# Patient Record
Sex: FEMALE | Race: WHITE | NOT HISPANIC OR LATINO | Employment: FULL TIME | URBAN - NONMETROPOLITAN AREA
[De-identification: names, ages, dates, MRNs, and addresses within clinical notes are randomized per-mention and may not be internally consistent; named-entity substitution may affect disease eponyms.]

---

## 2017-04-21 ENCOUNTER — OFFICE VISIT (OUTPATIENT)
Dept: UROLOGY | Facility: CLINIC | Age: 47
End: 2017-04-21

## 2017-04-21 VITALS
HEIGHT: 70 IN | DIASTOLIC BLOOD PRESSURE: 79 MMHG | HEART RATE: 93 BPM | BODY MASS INDEX: 25.05 KG/M2 | WEIGHT: 175 LBS | SYSTOLIC BLOOD PRESSURE: 128 MMHG

## 2017-04-21 DIAGNOSIS — R31.29 MICROSCOPIC HEMATURIA: ICD-10-CM

## 2017-04-21 DIAGNOSIS — R10.9 LEFT FLANK PAIN: ICD-10-CM

## 2017-04-21 DIAGNOSIS — N39.0 URINARY TRACT INFECTION, SITE UNSPECIFIED: Primary | ICD-10-CM

## 2017-04-21 DIAGNOSIS — R31.0 GROSS HEMATURIA: ICD-10-CM

## 2017-04-21 DIAGNOSIS — R30.1 PAINFUL BLADDER SPASM: ICD-10-CM

## 2017-04-21 LAB
ANION GAP SERPL CALCULATED.3IONS-SCNC: 5.4 MMOL/L (ref 3.6–11.2)
BILIRUB BLD-MCNC: NEGATIVE MG/DL
BUN BLD-MCNC: 13 MG/DL (ref 7–21)
BUN/CREAT SERPL: 17.1 (ref 7–25)
CALCIUM SPEC-SCNC: 9.6 MG/DL (ref 7.7–10)
CHLORIDE SERPL-SCNC: 110 MMOL/L (ref 99–112)
CLARITY, POC: CLEAR
CO2 SERPL-SCNC: 28.6 MMOL/L (ref 24.3–31.9)
COLOR UR: YELLOW
CREAT BLD-MCNC: 0.76 MG/DL (ref 0.43–1.29)
GFR SERPL CREATININE-BSD FRML MDRD: 83 ML/MIN/1.73
GLUCOSE BLD-MCNC: 128 MG/DL (ref 70–110)
GLUCOSE UR STRIP-MCNC: NEGATIVE MG/DL
KETONES UR QL: NEGATIVE
LEUKOCYTE EST, POC: NEGATIVE
NITRITE UR-MCNC: NEGATIVE MG/ML
OSMOLALITY SERPL CALC.SUM OF ELEC: 288.6 MOSM/KG (ref 273–305)
PH UR: 5.5 [PH] (ref 5–8)
POTASSIUM BLD-SCNC: 4.4 MMOL/L (ref 3.5–5.3)
PROT UR STRIP-MCNC: NEGATIVE MG/DL
RBC # UR STRIP: NORMAL /UL
SODIUM BLD-SCNC: 144 MMOL/L (ref 135–153)
SP GR UR: 1.03 (ref 1–1.03)
UROBILINOGEN UR QL: NORMAL

## 2017-04-21 PROCEDURE — 51798 US URINE CAPACITY MEASURE: CPT | Performed by: NURSE PRACTITIONER

## 2017-04-21 PROCEDURE — 99203 OFFICE O/P NEW LOW 30 MIN: CPT | Performed by: NURSE PRACTITIONER

## 2017-04-21 PROCEDURE — 81003 URINALYSIS AUTO W/O SCOPE: CPT | Performed by: NURSE PRACTITIONER

## 2017-04-21 PROCEDURE — 80048 BASIC METABOLIC PNL TOTAL CA: CPT | Performed by: NURSE PRACTITIONER

## 2017-04-21 RX ORDER — LISINOPRIL 10 MG/1
10 TABLET ORAL DAILY
COMMUNITY
End: 2022-04-25 | Stop reason: ALTCHOICE

## 2017-04-21 RX ORDER — ESOMEPRAZOLE MAGNESIUM 40 MG/1
CAPSULE, DELAYED RELEASE ORAL
COMMUNITY
Start: 2017-04-10

## 2017-04-21 RX ORDER — GABAPENTIN 100 MG/1
100 CAPSULE ORAL 3 TIMES DAILY
COMMUNITY
End: 2019-07-30

## 2017-04-21 RX ORDER — BUSPIRONE HYDROCHLORIDE 10 MG/1
10 TABLET ORAL 3 TIMES DAILY
COMMUNITY

## 2017-04-21 NOTE — PROGRESS NOTES
Chief Complaint:          Chief Complaint   Patient presents with   • Urinary Tract Infection       HPI:   46 y.o. female being seen for history of hematuria and recurrent UTI.  She states she was recently seen by her PCP and was diagnosed with a UTI and was started on 2 rounds of antibiotics.  She continues to have microhematuria today. Has episodes of gross hematuria with the last being on Wednesday of the week. She further complains of bladder pain and pressure along with left sided back and flank pain.  Does have a history of a herniated disc in her back. Denies any difficulty with urination but has sensation of incomplete bladder emptying.  Bladder scan today shows 0 mL.  Has a strong family history of cancer in her family.  Complains of nausea every 3-4 days and is currently on Zofran.  Smoker of 1 ppd for 23 years.    HPI        Past Medical History:        Past Medical History:   Diagnosis Date   • Acid reflux    • Hypertension          Current Meds:     Current Outpatient Prescriptions   Medication Sig Dispense Refill   • busPIRone (BUSPAR) 10 MG tablet Take 10 mg by mouth 3 (Three) Times a Day.     • esomeprazole (nexIUM) 40 MG capsule      • gabapentin (NEURONTIN) 100 MG capsule Take 100 mg by mouth 3 (Three) Times a Day.     • lisinopril (PRINIVIL,ZESTRIL) 10 MG tablet Take 10 mg by mouth Daily.       No current facility-administered medications for this visit.         Allergies:      Allergies   Allergen Reactions   • Penicillins         Past Surgical History:     Past Surgical History:   Procedure Laterality Date   •  SECTION     • CHOLECYSTECTOMY     • SKIN CANCER EXCISION      Feet         Social History:     Social History     Social History   • Marital status: Unknown     Spouse name: N/A   • Number of children: N/A   • Years of education: N/A     Occupational History   • Not on file.     Social History Main Topics   • Smoking status: Current Every Day Smoker   • Smokeless tobacco: Never  Used   • Alcohol use No   • Drug use: No   • Sexual activity: Not on file     Other Topics Concern   • Not on file     Social History Narrative   • No narrative on file       Family History:     Family History   Problem Relation Age of Onset   • Lung cancer Father    • Stomach cancer Mother    • Cancer Brother      bladder cancer       Review of Systems:     Review of Systems   Constitutional: Positive for fatigue. Negative for chills and fever.   Respiratory: Negative for cough, shortness of breath and wheezing.    Cardiovascular: Negative for leg swelling.   Gastrointestinal: Positive for abdominal pain. Negative for nausea and vomiting.   Musculoskeletal: Positive for back pain. Negative for joint swelling.   Neurological: Negative for dizziness and headaches.   Psychiatric/Behavioral: Negative for confusion.       Physical Exam:     Physical Exam   Constitutional: She is oriented to person, place, and time. She appears well-developed and well-nourished. No distress.   Abdominal: Soft. Bowel sounds are normal. She exhibits no distension and no mass. There is no tenderness. There is no rebound and no guarding. No hernia.   Musculoskeletal: Normal range of motion.   Neurological: She is alert and oriented to person, place, and time.   Skin: Skin is warm and dry. No rash noted. She is not diaphoretic. No erythema. No pallor.   Psychiatric: She has a normal mood and affect. Her behavior is normal. Judgment and thought content normal.   Nursing note and vitals reviewed.      Procedure:     No notes on file      Assessment:     Encounter Diagnoses   Name Primary?   • Urinary tract infection, site unspecified Yes   • Gross hematuria    • Microscopic hematuria    • Painful bladder spasm    • Left flank pain      Orders Placed This Encounter   Procedures   • CT Abdomen Pelvis With & Without Contrast     Standing Status:   Future     Standing Expiration Date:   4/21/2018     Scheduling Instructions:      No oral contrast,  do renal mass protocol     Order Specific Question:   Reason for Exam:     Answer:   gross hematuria: painful bladder; left back and flank pain     Order Specific Question:   Patient Pregnant     Answer:   No   • Basic Metabolic Panel   • POC Urinalysis Dipstick, Automated       Plan:   Will attempt to get a copy of the urine culture completed at her PCP office.  Recommend she have a CT scan of the abdomen and pelvis with and without contrast on a renal mass protocol and a cystoscopy by Dr. Arciniega or Dr. Larkin.  Discussed the procedures with the patient and she is agreeable.    Counseling was given to patient for the following topics diagnostic results, patient and family education, impressions and risks and benefits of treatment options. and the interim medical history and current results were reviewed.  A treatment plan with follow-up was made. Total time of the encounter was 32 minutes and 32 minutes were spent discussing Urinary tract infection, site unspecified [N39.0] face-to-face.       This document has been electronically signed by MEHNAZ Hutchison April 21, 2017 3:52 PM

## 2017-05-01 ENCOUNTER — HOSPITAL ENCOUNTER (OUTPATIENT)
Dept: CT IMAGING | Facility: HOSPITAL | Age: 47
Discharge: HOME | End: 2017-05-01
Admitting: NURSE PRACTITIONER

## 2017-05-01 DIAGNOSIS — R31.29 MICROSCOPIC HEMATURIA: ICD-10-CM

## 2017-05-01 DIAGNOSIS — N39.0 URINARY TRACT INFECTION, SITE UNSPECIFIED: ICD-10-CM

## 2017-05-01 DIAGNOSIS — R10.9 LEFT FLANK PAIN: ICD-10-CM

## 2017-05-01 DIAGNOSIS — R31.0 GROSS HEMATURIA: ICD-10-CM

## 2017-05-01 DIAGNOSIS — R30.1 PAINFUL BLADDER SPASM: ICD-10-CM

## 2017-05-01 LAB
B-HCG UR QL: NEGATIVE
INTERNAL NEGATIVE CONTROL: NEGATIVE
INTERNAL POSITIVE CONTROL: POSITIVE
Lab: NORMAL

## 2017-05-01 PROCEDURE — 0 IOPAMIDOL 61 % SOLUTION: Performed by: NURSE PRACTITIONER

## 2017-05-01 PROCEDURE — 74178 CT ABD&PLV WO CNTR FLWD CNTR: CPT | Performed by: RADIOLOGY

## 2017-05-01 PROCEDURE — 74178 CT ABD&PLV WO CNTR FLWD CNTR: CPT

## 2017-05-01 RX ADMIN — IOPAMIDOL 100 ML: 612 INJECTION, SOLUTION INTRAVENOUS at 09:42

## 2018-05-11 ENCOUNTER — TRANSCRIBE ORDERS (OUTPATIENT)
Dept: ADMINISTRATIVE | Facility: HOSPITAL | Age: 48
End: 2018-05-11

## 2018-05-11 ENCOUNTER — LAB (OUTPATIENT)
Dept: LAB | Facility: HOSPITAL | Age: 48
End: 2018-05-11

## 2018-05-11 DIAGNOSIS — R10.9 ABDOMINAL PAIN, UNSPECIFIED ABDOMINAL LOCATION: ICD-10-CM

## 2018-05-11 DIAGNOSIS — R10.9 ABDOMINAL PAIN, UNSPECIFIED ABDOMINAL LOCATION: Primary | ICD-10-CM

## 2018-09-13 ENCOUNTER — APPOINTMENT (OUTPATIENT)
Dept: MAMMOGRAPHY | Facility: HOSPITAL | Age: 48
End: 2018-09-13

## 2018-10-02 ENCOUNTER — LAB (OUTPATIENT)
Dept: LAB | Facility: HOSPITAL | Age: 48
End: 2018-10-02

## 2018-10-02 ENCOUNTER — TRANSCRIBE ORDERS (OUTPATIENT)
Dept: LAB | Facility: HOSPITAL | Age: 48
End: 2018-10-02

## 2018-10-02 DIAGNOSIS — R53.83 FATIGUE, UNSPECIFIED TYPE: ICD-10-CM

## 2018-10-02 DIAGNOSIS — R53.83 FATIGUE, UNSPECIFIED TYPE: Primary | ICD-10-CM

## 2018-10-02 LAB
25(OH)D3 SERPL-MCNC: 29 NG/ML
DEPRECATED RDW RBC AUTO: 45.6 FL (ref 37–54)
ERYTHROCYTE [DISTWIDTH] IN BLOOD BY AUTOMATED COUNT: 13.4 % (ref 11.3–14.5)
HCT VFR BLD AUTO: 44.1 % (ref 34.5–44)
HGB BLD-MCNC: 14.7 G/DL (ref 11.5–15.5)
MCH RBC QN AUTO: 30.9 PG (ref 27–31)
MCHC RBC AUTO-ENTMCNC: 33.3 G/DL (ref 32–36)
MCV RBC AUTO: 92.6 FL (ref 80–99)
PLATELET # BLD AUTO: 247 10*3/MM3 (ref 150–450)
PMV BLD AUTO: 10.4 FL (ref 6–12)
RBC # BLD AUTO: 4.76 10*6/MM3 (ref 3.89–5.14)
T4 FREE SERPL-MCNC: 1.5 NG/DL (ref 0.89–1.76)
TSH SERPL DL<=0.05 MIU/L-ACNC: 0.58 MIU/ML (ref 0.35–5.35)
VIT B12 BLD-MCNC: 698 PG/ML (ref 211–911)
WBC NRBC COR # BLD: 12.86 10*3/MM3 (ref 3.5–10.8)

## 2018-10-02 PROCEDURE — 36415 COLL VENOUS BLD VENIPUNCTURE: CPT

## 2018-10-02 PROCEDURE — 84443 ASSAY THYROID STIM HORMONE: CPT

## 2018-10-02 PROCEDURE — 82306 VITAMIN D 25 HYDROXY: CPT

## 2018-10-02 PROCEDURE — 84439 ASSAY OF FREE THYROXINE: CPT

## 2018-10-02 PROCEDURE — 85027 COMPLETE CBC AUTOMATED: CPT

## 2018-10-02 PROCEDURE — 82607 VITAMIN B-12: CPT

## 2018-10-04 ENCOUNTER — APPOINTMENT (OUTPATIENT)
Dept: MAMMOGRAPHY | Facility: HOSPITAL | Age: 48
End: 2018-10-04

## 2018-10-04 ENCOUNTER — APPOINTMENT (OUTPATIENT)
Dept: ULTRASOUND IMAGING | Facility: HOSPITAL | Age: 48
End: 2018-10-04

## 2018-11-05 DIAGNOSIS — R06.02 SOB (SHORTNESS OF BREATH): Primary | ICD-10-CM

## 2019-07-30 ENCOUNTER — OFFICE VISIT (OUTPATIENT)
Dept: UROLOGY | Facility: CLINIC | Age: 49
End: 2019-07-30

## 2019-07-30 ENCOUNTER — HOSPITAL ENCOUNTER (OUTPATIENT)
Dept: GENERAL RADIOLOGY | Facility: HOSPITAL | Age: 49
Discharge: HOME | End: 2019-07-30
Admitting: UROLOGY

## 2019-07-30 VITALS
DIASTOLIC BLOOD PRESSURE: 84 MMHG | SYSTOLIC BLOOD PRESSURE: 142 MMHG | WEIGHT: 187 LBS | HEIGHT: 70 IN | BODY MASS INDEX: 26.77 KG/M2

## 2019-07-30 DIAGNOSIS — N20.0 KIDNEY STONES: Primary | ICD-10-CM

## 2019-07-30 DIAGNOSIS — R10.9 FLANK PAIN: ICD-10-CM

## 2019-07-30 PROCEDURE — 74018 RADEX ABDOMEN 1 VIEW: CPT | Performed by: RADIOLOGY

## 2019-07-30 PROCEDURE — 74018 RADEX ABDOMEN 1 VIEW: CPT

## 2019-07-30 PROCEDURE — 99213 OFFICE O/P EST LOW 20 MIN: CPT | Performed by: UROLOGY

## 2019-07-30 RX ORDER — HYDROCODONE BITARTRATE AND ACETAMINOPHEN 5; 325 MG/1; MG/1
TABLET ORAL
Qty: 28 TABLET | Refills: 0 | Status: SHIPPED | OUTPATIENT
Start: 2019-07-30

## 2019-07-30 RX ORDER — IBUPROFEN 200 MG
200 TABLET ORAL EVERY 6 HOURS PRN
COMMUNITY

## 2019-07-30 RX ORDER — ACETAMINOPHEN 325 MG/1
650 TABLET ORAL EVERY 6 HOURS PRN
COMMUNITY

## 2019-07-30 RX ORDER — TAMSULOSIN HYDROCHLORIDE 0.4 MG/1
1 CAPSULE ORAL NIGHTLY
Qty: 30 CAPSULE | Refills: 11 | Status: SHIPPED | OUTPATIENT
Start: 2019-07-30

## 2019-07-30 NOTE — PROGRESS NOTES
Chief Complaint:          Right flank pain    HPI:   49 y.o. female.  Pt has had right sided flank pain for the past week.  She went to urgent care and for her pain.  Pt has had 7 stone this week.  Her kub today did not show any stones.  HPI  Pt took a photo on her phone that looked like a small stone when I looked at it on her phone    Past Medical History:        Past Medical History:   Diagnosis Date   • Acid reflux    • Hypertension          Current Meds:     Current Outpatient Medications   Medication Sig Dispense Refill   • acetaminophen (TYLENOL) 325 MG tablet Take 650 mg by mouth Every 6 (Six) Hours As Needed for Mild Pain .     • busPIRone (BUSPAR) 10 MG tablet Take 10 mg by mouth 3 (Three) Times a Day.     • esomeprazole (nexIUM) 40 MG capsule      • ibuprofen (ADVIL,MOTRIN) 200 MG tablet Take 200 mg by mouth Every 6 (Six) Hours As Needed for Mild Pain .     • lisinopril (PRINIVIL,ZESTRIL) 10 MG tablet Take 10 mg by mouth Daily.       No current facility-administered medications for this visit.         Allergies:      Allergies   Allergen Reactions   • Penicillins         Past Surgical History:     Past Surgical History:   Procedure Laterality Date   •  SECTION     • CHOLECYSTECTOMY     • SKIN CANCER EXCISION      Feet         Social History:     Social History     Socioeconomic History   • Marital status:      Spouse name: Not on file   • Number of children: Not on file   • Years of education: Not on file   • Highest education level: Not on file   Tobacco Use   • Smoking status: Current Every Day Smoker   • Smokeless tobacco: Never Used   Substance and Sexual Activity   • Alcohol use: No   • Drug use: No       Family History:     Family History   Problem Relation Age of Onset   • Lung cancer Father    • Stomach cancer Mother    • Cancer Brother         bladder cancer       Review of Systems:     Review of Systems   Constitutional: Positive for chills. Negative for fever.   HENT: Negative  "for sinus pressure and sinus pain.    Respiratory: Negative for cough.    Cardiovascular: Negative for leg swelling.   Gastrointestinal: Positive for abdominal pain.   Genitourinary: Positive for flank pain, frequency, hematuria and urgency. Negative for dysuria.   Musculoskeletal: Positive for back pain.   Neurological: Negative for headaches.   Psychiatric/Behavioral: The patient is not nervous/anxious.        Physical Exam:     Physical Exam   Constitutional: She is oriented to person, place, and time. She appears well-developed.   HENT:   Head: Normocephalic.   Right Ear: External ear normal.   Left Ear: External ear normal.   Nose: Nose normal.   Mouth/Throat: Oropharynx is clear and moist.   Eyes: Conjunctivae and EOM are normal. Pupils are equal, round, and reactive to light.   Neck: Normal range of motion. Neck supple. No tracheal deviation present. No thyromegaly present.   Cardiovascular: Normal heart sounds. Exam reveals no gallop and no friction rub.   No murmur heard.  Pulmonary/Chest: Effort normal and breath sounds normal. No respiratory distress. She has no wheezes. She has no rales. She exhibits no tenderness.   Abdominal: Soft. Bowel sounds are normal. She exhibits no distension and no mass. There is no tenderness. There is no rebound and no guarding. No hernia.   Musculoskeletal: Normal range of motion. She exhibits no edema.   Lymphadenopathy:     She has no cervical adenopathy.   Neurological: She is alert and oriented to person, place, and time. She displays normal reflexes. No cranial nerve deficit. She exhibits normal muscle tone. Coordination normal.   Skin: Skin is warm. No rash noted.   Psychiatric: She has a normal mood and affect. Judgment normal.       /84 (BP Location: Left arm, Patient Position: Sitting, Cuff Size: Adult)   Ht 177.8 cm (70\")   Wt 84.8 kg (187 lb)   BMI 26.83 kg/m²    Procedure:         Assessment:     Encounter Diagnoses   Name Primary?   • Kidney stones Yes "   • Flank pain        Orders Placed This Encounter   Procedures   • XR Abdomen KUB     Order Specific Question:   Reason for Exam:     Answer:   kidney stones     Order Specific Question:   Patient Pregnant     Answer:   Unknown       Patient reports that she is not currently experiencing any symptoms of urinary incontinence.      Plan:   Will get a ct scan to document size and number of stones will give her hat and straing.    Patient's Body mass index is 26.83 kg/m². BMI is within normal parameters. No follow-up required..      Smoking Cessation Counseling:  Current some day smoker. less than 3 minutes spent counseling. Will try to cut down.  Patient is going to follow up with PCP to discuss treatment/medication options to quit tobacco use.     During this visit, I spent 2 minutes counseling Aliza regarding tobacco cessation.        Counseling was given to patient and family for the following topics impressions as follows: stones. The interim medical history and current results were reviewed.  A treatment plan with follow-up was made for Kidney stones [N20.0].   This document has been electronically signed by Rene Arciniega MD July 30, 2019 4:04 PM

## 2019-08-06 ENCOUNTER — HOSPITAL ENCOUNTER (OUTPATIENT)
Dept: CT IMAGING | Facility: HOSPITAL | Age: 49
Discharge: HOME | End: 2019-08-06
Admitting: UROLOGY

## 2019-08-06 DIAGNOSIS — N20.0 KIDNEY STONES: ICD-10-CM

## 2019-08-06 PROCEDURE — 74176 CT ABD & PELVIS W/O CONTRAST: CPT

## 2019-08-06 PROCEDURE — 74176 CT ABD & PELVIS W/O CONTRAST: CPT | Performed by: RADIOLOGY

## 2019-08-13 ENCOUNTER — OFFICE VISIT (OUTPATIENT)
Dept: UROLOGY | Facility: CLINIC | Age: 49
End: 2019-08-13

## 2019-08-13 VITALS
SYSTOLIC BLOOD PRESSURE: 138 MMHG | BODY MASS INDEX: 26.92 KG/M2 | HEIGHT: 70 IN | WEIGHT: 188 LBS | DIASTOLIC BLOOD PRESSURE: 82 MMHG

## 2019-08-13 DIAGNOSIS — R10.9 FLANK PAIN: Primary | ICD-10-CM

## 2019-08-13 PROCEDURE — 99213 OFFICE O/P EST LOW 20 MIN: CPT | Performed by: UROLOGY

## 2019-08-13 PROCEDURE — 99406 BEHAV CHNG SMOKING 3-10 MIN: CPT | Performed by: UROLOGY

## 2019-08-13 NOTE — PROGRESS NOTES
Chief Complaint:          RIGHT flank oswald    HPI:   49 y.o. female.  Pt's pain is markedly improved.  Her ct scan did not show any stones.  HPI      Past Medical History:        Past Medical History:   Diagnosis Date   • Acid reflux    • Hypertension          Current Meds:     Current Outpatient Medications   Medication Sig Dispense Refill   • acetaminophen (TYLENOL) 325 MG tablet Take 650 mg by mouth Every 6 (Six) Hours As Needed for Mild Pain .     • esomeprazole (nexIUM) 40 MG capsule      • lisinopril (PRINIVIL,ZESTRIL) 10 MG tablet Take 10 mg by mouth Daily.     • busPIRone (BUSPAR) 10 MG tablet Take 10 mg by mouth 3 (Three) Times a Day.     • HYDROcodone-acetaminophen (NORCO) 5-325 MG per tablet 1 to 2 Tablets Every 6 Hours as Needed for PAIN 28 tablet 0   • ibuprofen (ADVIL,MOTRIN) 200 MG tablet Take 200 mg by mouth Every 6 (Six) Hours As Needed for Mild Pain .     • tamsulosin (FLOMAX) 0.4 MG capsule 24 hr capsule Take 1 capsule by mouth Every Night. 30 capsule 11     No current facility-administered medications for this visit.         Allergies:      Allergies   Allergen Reactions   • Penicillins         Past Surgical History:     Past Surgical History:   Procedure Laterality Date   •  SECTION     • CHOLECYSTECTOMY     • SKIN CANCER EXCISION      Feet         Social History:     Social History     Socioeconomic History   • Marital status:      Spouse name: Not on file   • Number of children: Not on file   • Years of education: Not on file   • Highest education level: Not on file   Tobacco Use   • Smoking status: Current Every Day Smoker   • Smokeless tobacco: Never Used   Substance and Sexual Activity   • Alcohol use: No   • Drug use: No       Family History:     Family History   Problem Relation Age of Onset   • Lung cancer Father    • Stomach cancer Mother    • Cancer Brother         bladder cancer       Review of Systems:     Review of Systems   Genitourinary: Positive for flank pain and  "vaginal discharge.   All other systems reviewed and are negative.        I have reviewed the follow portions of the patient's history and confirmed they are accurate today:  allergies, current medications, past family history, past medical history, past social history, past surgical history, problem list and ROS  Physical Exam:     Physical Exam   Constitutional: She is oriented to person, place, and time. She appears well-developed.   HENT:   Head: Normocephalic.   Right Ear: External ear normal.   Left Ear: External ear normal.   Nose: Nose normal.   Mouth/Throat: Oropharynx is clear and moist.   Eyes: Conjunctivae and EOM are normal. Pupils are equal, round, and reactive to light.   Neck: Normal range of motion. Neck supple. No tracheal deviation present. No thyromegaly present.   Cardiovascular: Normal heart sounds. Exam reveals no gallop and no friction rub.   No murmur heard.  Pulmonary/Chest: Effort normal and breath sounds normal. No respiratory distress. She has no wheezes. She has no rales. She exhibits no tenderness.   Abdominal: Soft. Bowel sounds are normal. She exhibits no distension and no mass. There is no tenderness. There is no rebound and no guarding. No hernia.   Genitourinary: Vagina normal and uterus normal.   Musculoskeletal: Normal range of motion. She exhibits no edema.   Lymphadenopathy:     She has no cervical adenopathy.   Neurological: She is alert and oriented to person, place, and time. She displays normal reflexes. No cranial nerve deficit. She exhibits normal muscle tone. Coordination normal.   Skin: Skin is warm. No rash noted.   Psychiatric: She has a normal mood and affect. Judgment normal.       /82 (BP Location: Left arm, Patient Position: Sitting, Cuff Size: Adult)   Ht 177.8 cm (70\")   Wt 85.3 kg (188 lb)   BMI 26.98 kg/m²    Procedure:         Assessment:     Encounter Diagnosis   Name Primary?   • Flank pain Yes       No orders of the defined types were placed in " this encounter.      Patient reports that she is not currently experiencing any symptoms of urinary incontinence.      Plan:   Will get litho link and see in 3 months.    Patient's Body mass index is 26.98 kg/m². BMI is within normal parameters. No follow-up required..      Smoking Cessation Counseling:  Current some day smoker. less than 3 minutes spent counseling. Agreeable to stop.  Patient is going to follow up with PCP to discuss treatment/medication options to quit tobacco use.     During this visit, I spent 3 minutes counseling Aliza regarding tobacco cessation.        Counseling was given to patient for the following topics instructions for management as follows: stones and stopping smoking. The interim medical history and current results were reviewed.  A treatment plan with follow-up was made for Flank pain [R10.9].       This document has been electronically signed by Rene Arciniega MD August 13, 2019 9:36 AM

## 2020-06-25 ENCOUNTER — TRANSCRIBE ORDERS (OUTPATIENT)
Dept: ADMINISTRATIVE | Facility: HOSPITAL | Age: 50
End: 2020-06-25

## 2020-07-13 ENCOUNTER — TRANSCRIBE ORDERS (OUTPATIENT)
Dept: ADMINISTRATIVE | Facility: HOSPITAL | Age: 50
End: 2020-07-13

## 2020-07-13 DIAGNOSIS — N64.4 BREAST PAIN, LEFT: Primary | ICD-10-CM

## 2020-07-13 DIAGNOSIS — N64.52 NIPPLE DISCHARGE: ICD-10-CM

## 2020-08-13 ENCOUNTER — HOSPITAL ENCOUNTER (OUTPATIENT)
Dept: ULTRASOUND IMAGING | Facility: HOSPITAL | Age: 50
Discharge: HOME | End: 2020-08-13

## 2020-08-13 ENCOUNTER — APPOINTMENT (OUTPATIENT)
Dept: OTHER | Facility: HOSPITAL | Age: 50
End: 2020-08-13

## 2020-08-13 ENCOUNTER — HOSPITAL ENCOUNTER (OUTPATIENT)
Dept: MAMMOGRAPHY | Facility: HOSPITAL | Age: 50
Discharge: HOME | End: 2020-08-13
Admitting: NURSE PRACTITIONER

## 2020-08-13 ENCOUNTER — TELEPHONE (OUTPATIENT)
Dept: MAMMOGRAPHY | Facility: HOSPITAL | Age: 50
End: 2020-08-13

## 2020-08-13 ENCOUNTER — TRANSCRIBE ORDERS (OUTPATIENT)
Dept: LAB | Facility: HOSPITAL | Age: 50
End: 2020-08-13

## 2020-08-13 ENCOUNTER — LAB (OUTPATIENT)
Dept: LAB | Facility: HOSPITAL | Age: 50
End: 2020-08-13

## 2020-08-13 DIAGNOSIS — N64.52 NIPPLE DISCHARGE: ICD-10-CM

## 2020-08-13 DIAGNOSIS — N64.4 BREAST PAIN, LEFT: ICD-10-CM

## 2020-08-13 DIAGNOSIS — Z01.419 ROUTINE GYNECOLOGICAL EXAMINATION: ICD-10-CM

## 2020-08-13 DIAGNOSIS — Z92.89 H/O MAMMOGRAM: ICD-10-CM

## 2020-08-13 DIAGNOSIS — N92.6 IRREGULAR MENSTRUAL CYCLE: ICD-10-CM

## 2020-08-13 DIAGNOSIS — Z01.419 ROUTINE GYNECOLOGICAL EXAMINATION: Primary | ICD-10-CM

## 2020-08-13 LAB
25(OH)D3 SERPL-MCNC: 49.6 NG/ML (ref 30–100)
ALBUMIN SERPL-MCNC: 4.2 G/DL (ref 3.5–5.2)
ALBUMIN/GLOB SERPL: 1.8 G/DL
ALP SERPL-CCNC: 74 U/L (ref 39–117)
ALT SERPL W P-5'-P-CCNC: 24 U/L (ref 1–33)
ANION GAP SERPL CALCULATED.3IONS-SCNC: 13.1 MMOL/L (ref 5–15)
AST SERPL-CCNC: 16 U/L (ref 1–32)
BILIRUB SERPL-MCNC: 0.3 MG/DL (ref 0–1.2)
BUN SERPL-MCNC: 14 MG/DL (ref 6–20)
BUN/CREAT SERPL: 21.2 (ref 7–25)
CALCIUM SPEC-SCNC: 9.9 MG/DL (ref 8.6–10.5)
CHLORIDE SERPL-SCNC: 102 MMOL/L (ref 98–107)
CHOLEST SERPL-MCNC: 209 MG/DL (ref 0–200)
CO2 SERPL-SCNC: 26.9 MMOL/L (ref 22–29)
CREAT SERPL-MCNC: 0.66 MG/DL (ref 0.57–1)
DEPRECATED RDW RBC AUTO: 42.3 FL (ref 37–54)
ERYTHROCYTE [DISTWIDTH] IN BLOOD BY AUTOMATED COUNT: 12.9 % (ref 12.3–15.4)
ESTRADIOL SERPL HS-MCNC: 5.3 PG/ML
FSH SERPL-ACNC: 81.5 MIU/ML
GFR SERPL CREATININE-BSD FRML MDRD: 96 ML/MIN/1.73
GLOBULIN UR ELPH-MCNC: 2.4 GM/DL
GLUCOSE SERPL-MCNC: 99 MG/DL (ref 65–99)
HBA1C MFR BLD: 5.93 % (ref 4.8–5.6)
HCT VFR BLD AUTO: 44.5 % (ref 34–46.6)
HDLC SERPL-MCNC: 33 MG/DL (ref 40–60)
HGB BLD-MCNC: 15.2 G/DL (ref 12–15.9)
LDLC SERPL CALC-MCNC: 138 MG/DL (ref 0–100)
LDLC/HDLC SERPL: 4.18 {RATIO}
LH SERPL-ACNC: 56.8 MIU/ML
MCH RBC QN AUTO: 30.8 PG (ref 26.6–33)
MCHC RBC AUTO-ENTMCNC: 34.2 G/DL (ref 31.5–35.7)
MCV RBC AUTO: 90.3 FL (ref 79–97)
PLATELET # BLD AUTO: 251 10*3/MM3 (ref 140–450)
PMV BLD AUTO: 10.9 FL (ref 6–12)
POTASSIUM SERPL-SCNC: 4 MMOL/L (ref 3.5–5.2)
PROGEST SERPL-MCNC: <0.05 NG/ML
PROLACTIN SERPL-MCNC: 6.14 NG/ML (ref 4.79–23.3)
PROT SERPL-MCNC: 6.6 G/DL (ref 6–8.5)
RBC # BLD AUTO: 4.93 10*6/MM3 (ref 3.77–5.28)
SODIUM SERPL-SCNC: 142 MMOL/L (ref 136–145)
TESTOST SERPL-MCNC: 10.1 NG/DL (ref 8.4–48.1)
TRIGL SERPL-MCNC: 191 MG/DL (ref 0–150)
TSH SERPL DL<=0.05 MIU/L-ACNC: 1.16 UIU/ML (ref 0.27–4.2)
VLDLC SERPL-MCNC: 38.2 MG/DL (ref 5–40)
WBC # BLD AUTO: 8.96 10*3/MM3 (ref 3.4–10.8)

## 2020-08-13 PROCEDURE — 82306 VITAMIN D 25 HYDROXY: CPT

## 2020-08-13 PROCEDURE — 80053 COMPREHEN METABOLIC PANEL: CPT

## 2020-08-13 PROCEDURE — 84402 ASSAY OF FREE TESTOSTERONE: CPT

## 2020-08-13 PROCEDURE — 83525 ASSAY OF INSULIN: CPT

## 2020-08-13 PROCEDURE — 77066 DX MAMMO INCL CAD BI: CPT | Performed by: RADIOLOGY

## 2020-08-13 PROCEDURE — 83002 ASSAY OF GONADOTROPIN (LH): CPT

## 2020-08-13 PROCEDURE — 85027 COMPLETE CBC AUTOMATED: CPT

## 2020-08-13 PROCEDURE — 84403 ASSAY OF TOTAL TESTOSTERONE: CPT

## 2020-08-13 PROCEDURE — 77066 DX MAMMO INCL CAD BI: CPT

## 2020-08-13 PROCEDURE — 84443 ASSAY THYROID STIM HORMONE: CPT

## 2020-08-13 PROCEDURE — 36415 COLL VENOUS BLD VENIPUNCTURE: CPT

## 2020-08-13 PROCEDURE — 82670 ASSAY OF TOTAL ESTRADIOL: CPT

## 2020-08-13 PROCEDURE — 76642 ULTRASOUND BREAST LIMITED: CPT

## 2020-08-13 PROCEDURE — 84146 ASSAY OF PROLACTIN: CPT

## 2020-08-13 PROCEDURE — 83001 ASSAY OF GONADOTROPIN (FSH): CPT

## 2020-08-13 PROCEDURE — 77062 BREAST TOMOSYNTHESIS BI: CPT | Performed by: RADIOLOGY

## 2020-08-13 PROCEDURE — G0279 TOMOSYNTHESIS, MAMMO: HCPCS

## 2020-08-13 PROCEDURE — 83036 HEMOGLOBIN GLYCOSYLATED A1C: CPT

## 2020-08-13 PROCEDURE — 82627 DEHYDROEPIANDROSTERONE: CPT

## 2020-08-13 PROCEDURE — 83498 ASY HYDROXYPROGESTERONE 17-D: CPT

## 2020-08-13 PROCEDURE — 76642 ULTRASOUND BREAST LIMITED: CPT | Performed by: RADIOLOGY

## 2020-08-13 PROCEDURE — 84144 ASSAY OF PROGESTERONE: CPT

## 2020-08-13 PROCEDURE — 80061 LIPID PANEL: CPT

## 2020-08-13 NOTE — TELEPHONE ENCOUNTER
"Attempted to call patient to notify of a surgical consult appoint with Dr. ERICA Armendariz on 8/19/20 @ [id number].  Unable to leave message for patient to call back \"mailbox full.\"  Attempted to call second contact number, no answer/voicemail.  Will attempt to call back at a later time.  "

## 2020-08-14 ENCOUNTER — TELEPHONE (OUTPATIENT)
Dept: MAMMOGRAPHY | Facility: HOSPITAL | Age: 50
End: 2020-08-14

## 2020-08-14 LAB
17OHP SERPL-MCNC: NORMAL NG/DL
DHEA-S SERPL-MCNC: 63.1 UG/DL (ref 41.2–243.7)
TESTOST FREE SERPL-MCNC: NORMAL PG/ML

## 2020-08-14 NOTE — TELEPHONE ENCOUNTER
Pt notified of surgical consult appointment with Dr. ERICA Armendariz on 8/19/20 @ [id number]/[id number] . Pt given office contact & location information. Told to bring photo ID, list of prescription & OTC medications, insurance information, must wear a mask. Encouraged pt to call back or contact surgeon's office with further questions. Pt verbalized understanding.

## 2020-08-16 LAB — INSULIN SERPL-ACNC: 11 UIU/ML

## 2020-08-17 ENCOUNTER — TRANSCRIBE ORDERS (OUTPATIENT)
Dept: ADMINISTRATIVE | Facility: HOSPITAL | Age: 50
End: 2020-08-17

## 2020-08-17 DIAGNOSIS — R10.12 LUQ ABDOMINAL PAIN: Primary | ICD-10-CM

## 2020-08-20 ENCOUNTER — TELEPHONE (OUTPATIENT)
Dept: GENETICS | Facility: HOSPITAL | Age: 50
End: 2020-08-20

## 2020-08-24 ENCOUNTER — TRANSCRIBE ORDERS (OUTPATIENT)
Dept: ADMINISTRATIVE | Facility: HOSPITAL | Age: 50
End: 2020-08-24

## 2020-08-24 ENCOUNTER — APPOINTMENT (OUTPATIENT)
Dept: CT IMAGING | Facility: HOSPITAL | Age: 50
End: 2020-08-24

## 2020-08-24 DIAGNOSIS — G89.29 CHRONIC LEFT UPPER QUADRANT PAIN: Primary | ICD-10-CM

## 2020-08-24 DIAGNOSIS — R10.12 ABDOMINAL PAIN, LEFT UPPER QUADRANT: ICD-10-CM

## 2020-08-24 DIAGNOSIS — R10.12 CHRONIC LEFT UPPER QUADRANT PAIN: Primary | ICD-10-CM

## 2020-08-31 ENCOUNTER — HOSPITAL ENCOUNTER (OUTPATIENT)
Dept: MRI IMAGING | Facility: HOSPITAL | Age: 50
Discharge: HOME | End: 2020-08-31
Admitting: SURGERY

## 2020-08-31 DIAGNOSIS — N64.4 PAIN OF LEFT BREAST: ICD-10-CM

## 2020-08-31 DIAGNOSIS — Z80.3 FAMILY HISTORY OF MALIGNANT NEOPLASM OF BREAST: ICD-10-CM

## 2020-08-31 PROCEDURE — 0 GADOBENATE DIMEGLUMINE 529 MG/ML SOLUTION: Performed by: SURGERY

## 2020-08-31 PROCEDURE — C8937 CAD BREAST MRI: HCPCS

## 2020-08-31 PROCEDURE — A9577 INJ MULTIHANCE: HCPCS | Performed by: SURGERY

## 2020-08-31 PROCEDURE — 77049 MRI BREAST C-+ W/CAD BI: CPT | Performed by: RADIOLOGY

## 2020-08-31 PROCEDURE — 77049 MRI BREAST C-+ W/CAD BI: CPT

## 2020-08-31 RX ADMIN — GADOBENATE DIMEGLUMINE 15 ML: 529 INJECTION, SOLUTION INTRAVENOUS at 13:52

## 2020-09-01 ENCOUNTER — TELEPHONE (OUTPATIENT)
Dept: GENETICS | Facility: HOSPITAL | Age: 50
End: 2020-09-01

## 2020-09-02 ENCOUNTER — TELEPHONE (OUTPATIENT)
Dept: MRI IMAGING | Facility: HOSPITAL | Age: 50
End: 2020-09-02

## 2020-09-09 ENCOUNTER — HOSPITAL ENCOUNTER (OUTPATIENT)
Dept: CT IMAGING | Facility: HOSPITAL | Age: 50
Discharge: HOME | End: 2020-09-09
Admitting: INTERNAL MEDICINE

## 2020-09-09 DIAGNOSIS — G89.29 CHRONIC LEFT UPPER QUADRANT PAIN: ICD-10-CM

## 2020-09-09 DIAGNOSIS — R10.12 ABDOMINAL PAIN, LEFT UPPER QUADRANT: ICD-10-CM

## 2020-09-09 DIAGNOSIS — R10.12 CHRONIC LEFT UPPER QUADRANT PAIN: ICD-10-CM

## 2020-09-09 PROCEDURE — 74178 CT ABD&PLV WO CNTR FLWD CNTR: CPT | Performed by: RADIOLOGY

## 2020-09-09 PROCEDURE — 74170 CT ABD WO CNTRST FLWD CNTRST: CPT

## 2020-09-09 PROCEDURE — 0 IOVERSOL 68 % SOLUTION: Performed by: INTERNAL MEDICINE

## 2020-09-09 RX ADMIN — IOVERSOL 100 ML: 678 INJECTION INTRA-ARTERIAL; INTRAVENOUS at 11:09

## 2020-09-22 ENCOUNTER — CLINICAL SUPPORT (OUTPATIENT)
Dept: GENETICS | Facility: HOSPITAL | Age: 50
End: 2020-09-22

## 2020-09-22 DIAGNOSIS — Z80.51 FAMILY HISTORY OF CANCER OF THE KIDNEY: ICD-10-CM

## 2020-09-22 DIAGNOSIS — Z80.3 FAMILY HISTORY OF BREAST CANCER: Primary | ICD-10-CM

## 2020-09-22 DIAGNOSIS — Z80.0 FAMILY HISTORY OF STOMACH CANCER: ICD-10-CM

## 2020-09-22 DIAGNOSIS — Z80.41 FH: OVARIAN CANCER: ICD-10-CM

## 2020-09-22 NOTE — PROGRESS NOTES
ADDENDUM:  As of 2/21/22 patient had not sent in saliva sample for genetic testing, and genetic testing has not been completed at this time.  Patient is welcome to recontact our office if she decides to pursue testing in the future.      Aliza Canela, a 50-year-old female, was referred for genetic counseling due to a family history of breast cancer.  Genetic counseling was provided via telehealth.  Ms. Canela was 14 years old at menarche and 25 when she had her first child.  She is premenopausal and retains her uterus and ovaries.  She had a colonoscopy earlier this year, with a few polyps identified.  She has annual mammograms and has had one breast biopsy.  She was interested in discussing her risk for a hereditary cancer syndrome.   Ms. Canela decided to pursue comprehensive genetic testing to evaluate her risk of cancer, therefore the CancerNext panel was ordered through [employer] which analyzes 36 genes associated with an increased cancer risk. The genes on this panel include APC, JOE, AXIN2, BARD1, BMPR1A, BRCA1, BRCA2, BRIP1, CDH1, CDK4, CDKN2A, CHEK2, DICER1, EPCAM, GREM1, HOXB13, MLH1, MRE11A, MSH2, MSH3, MSH6, MUTYH, NBN, NF1, NTHL1, PALB2, PMS2, POLD1, POLE, PTEN, RAD51C, RAD51D, RECQL, SMAD4, SMARCA4, STK11, and TP53. A saliva sample collection kit is being sent to Ms. Canela Results are expected in approximately 2-3 weeks after sample is sent in.    PERTINENT FAMILY HISTORY: (See attached pedigree)   Sister:   Breast cancer, 48  Sister:  Ovarian cancer, 30  Sister:  Liver cancer, 49    Melanoma  Sister:  Lung cancer  Brother:  Bone cancer, 4  Brother:  Bladder cancer, 55  Brother:  Kidney cancer, 45   Mother:  Stomach cancer, 75    We do not have medical records regarding any of these diagnoses.      RISK ASSESSMENT:  Ms. Canela’s family history raised the question of a hereditary cancer syndrome.  clearly meets NCCN guidelines criteria for BRCA1/2 testing based on having a close relative diagnosed  with ovarian cancer as well as a relative diagnosed with breast cancer under age 50.  When affected relatives are unavailable to pursue genetic testing, it is reasonable to offer to an unaffected individual.  Based on the presence of other clinically significant breast cancer related genes, the standard approach to hereditary cancer genetic testing at this time is via multi-gene panel, which evaluates for BRCA1/2 as well as several additional genes associated with a hereditary risk for breast cancer and other cancers. If genetic testing is negative, management should be guided by a family history-based risk assessment.    GENETIC COUNSELING:  We reviewed the family history information in detail. Cases of cancer follow three general patterns: sporadic, familial, and hereditary.  While most cancer is sporadic, some cases appear to occur in family clusters.  These cases are said to be familial and account for 10-20% of cancer cases.  Familial cases may be due to a combination of shared genes and environmental factors among family members.  In even fewer families, the risk for cancer is inherited, and the genes responsible for the increased cancer risk are known.      Family histories typical of hereditary cancer syndromes usually include multiple first- and second-degree relatives diagnosed with cancer types that define a syndrome.  These cases tend to be diagnosed at younger-than-expected ages and can be bilateral or multifocal.  The cancer in these families follows an autosomal dominant inheritance pattern, which indicates the likely presence of a mutation in a cancer susceptibility gene.  Children and siblings of an individual believed to carry this mutation have a 50% chance of inheriting that mutation, thereby inheriting the increased risk to develop cancer.  These mutations can be passed down from the maternal or the paternal lineage.    Hereditary breast cancer accounts for 5-10% of all cases of breast cancer.   A significant proportion (50%) of hereditary breast cancer can be attributed to mutations in the BRCA1 and BRCA2 genes.  Mutations in these genes confer an increased risk for breast cancer, ovarian cancer, male breast cancer, prostate cancer, and pancreatic cancer.  Women with a BRCA1 or BRCA2 mutation have up to an 87% lifetime risk of breast cancer and up to a 44% risk of ovarian cancer.  There are other clinically significant breast cancer related genes in addition to BRCA1/2, including PALB2, JOE, and CHEK2. There are other, more rare, hereditary cancer syndromes. Based on Ms. Canela’s family history and her desire to get more information regarding her personal risks, she opted to pursue testing through a panel evaluating several other genes known to increase the risk for cancer.    GENETIC TESTING:  The risks, benefits, and limitations of genetic testing and implications for clinical management following testing were reviewed.  DNA test results can influence decisions regarding screening and prevention.  Genetic testing can have significant psychological implications for both individuals and families. Also discussed was the possibility of employment and insurance discrimination based on genetic test results and the laws in place to prevent this, as well as the limitations of these laws.      We discussed panel testing, which would involve testing 34 genes associated with increased cancer risk. The implications of a positive or negative test result were discussed.  We also discussed the importance of testing on an affected relative. In general, a negative genetic test result is most informative if a mutation has first been established in an affected member of the family.  In cases where an affected individual is not available or interested in testing, it is appropriate to offer testing to an unaffected individual. We discussed the possibility that, in some cases, genetic test results may be ambiguous due to the  identification of a genetic variant. These variants may or may not be associated with an increased cancer risk. With multigene panel testing, it is not uncommon for a variant of uncertain significance (VUS) to be identified.  If a VUS is identified, testing family members is typically not recommended and screening recommendations are made based on the family history.  The laboratories that perform genetic testing work to reclassify the VUS and send out an amended report if and when a VUS is reclassified.  The majority of variant findings are ultimately reclassified to a negative result. Given her family history, a negative test result does not eliminate all cancer risk, although the risk would not be as high as it would with positive genetic testing. We also discussed that some of the genes on this particular panel have not been well studied yet and there may not be clear implications or guidelines for some of the genes included on this comprehensive panel.    PLAN: Genetic testing via the CancerNext panel through [employer] was ordered and a saliva sample collection kit is being sent to Ms. Canela.  We will contact Ms. Canela with her results once they are received.     Sara Verdugo MS, [facility]  Licensed Certified Genetic Counselor

## 2021-03-08 ENCOUNTER — HOSPITAL ENCOUNTER (OUTPATIENT)
Dept: GENERAL RADIOLOGY | Facility: HOSPITAL | Age: 51
Discharge: HOME | End: 2021-03-08
Admitting: UROLOGY

## 2021-03-08 ENCOUNTER — OFFICE VISIT (OUTPATIENT)
Dept: UROLOGY | Facility: CLINIC | Age: 51
End: 2021-03-08

## 2021-03-08 VITALS — WEIGHT: 180 LBS | HEIGHT: 70 IN | BODY MASS INDEX: 25.77 KG/M2 | TEMPERATURE: 97.7 F

## 2021-03-08 DIAGNOSIS — N20.0 KIDNEY STONES: Primary | ICD-10-CM

## 2021-03-08 DIAGNOSIS — R35.0 FREQUENCY OF MICTURITION: ICD-10-CM

## 2021-03-08 DIAGNOSIS — N39.0 RECURRENT URINARY TRACT INFECTION: ICD-10-CM

## 2021-03-08 DIAGNOSIS — N20.0 KIDNEY STONES: ICD-10-CM

## 2021-03-08 LAB
BILIRUB BLD-MCNC: ABNORMAL MG/DL
CLARITY, POC: CLEAR
COLOR UR: YELLOW
GLUCOSE UR STRIP-MCNC: NEGATIVE MG/DL
KETONES UR QL: NEGATIVE
LEUKOCYTE EST, POC: NEGATIVE
NITRITE UR-MCNC: NEGATIVE MG/ML
PH UR: 6 [PH] (ref 5–8)
PROT UR STRIP-MCNC: NEGATIVE MG/DL
RBC # UR STRIP: NEGATIVE /UL
SP GR UR: 1.02 (ref 1–1.03)
UROBILINOGEN UR QL: NORMAL

## 2021-03-08 PROCEDURE — 99213 OFFICE O/P EST LOW 20 MIN: CPT | Performed by: UROLOGY

## 2021-03-08 PROCEDURE — 74018 RADEX ABDOMEN 1 VIEW: CPT

## 2021-03-08 PROCEDURE — 74018 RADEX ABDOMEN 1 VIEW: CPT | Performed by: RADIOLOGY

## 2021-03-08 PROCEDURE — 81003 URINALYSIS AUTO W/O SCOPE: CPT | Performed by: UROLOGY

## 2021-03-08 RX ORDER — NITROFURANTOIN 25; 75 MG/1; MG/1
100 CAPSULE ORAL DAILY
Qty: 56 CAPSULE | Refills: 3 | Status: SHIPPED | OUTPATIENT
Start: 2021-03-08

## 2021-03-09 PROBLEM — N20.0 KIDNEY STONES: Status: ACTIVE | Noted: 2021-03-09

## 2021-03-09 PROBLEM — N39.0 RECURRENT URINARY TRACT INFECTION: Status: ACTIVE | Noted: 2021-03-09

## 2021-03-09 NOTE — PROGRESS NOTES
"Chief Complaint:          Chief Complaint   Patient presents with   • Flank Pain       HPI:   50 y.o. female referred as a new patient with multiple stones that she was seen at the [facility] on [address] and was told she had a \"stone with infection\" this is her third time this year.  She did not bring her films.  She has had blood in the urine.  She has been antibiotics since she was they are which was Macrobid.  She has irritable bowel syndrome with constipation with a stool weekly.  She is a  3 para 3 with prior C-sections negative physical exam negative urinalysis KUB was negative by my reading I Felicita put her on prophylactic antibiotics see her back in 6 weeks have her bring her films for my review next time she comes in.      Past Medical History:        Past Medical History:   Diagnosis Date   • Acid reflux    • Hypertension          Current Meds:     Current Outpatient Medications   Medication Sig Dispense Refill   • acetaminophen (TYLENOL) 325 MG tablet Take 650 mg by mouth Every 6 (Six) Hours As Needed for Mild Pain .     • esomeprazole (nexIUM) 40 MG capsule      • lisinopril (PRINIVIL,ZESTRIL) 10 MG tablet Take 10 mg by mouth Daily.     • busPIRone (BUSPAR) 10 MG tablet Take 10 mg by mouth 3 (Three) Times a Day.     • HYDROcodone-acetaminophen (NORCO) 5-325 MG per tablet 1 to 2 Tablets Every 6 Hours as Needed for PAIN 28 tablet 0   • ibuprofen (ADVIL,MOTRIN) 200 MG tablet Take 200 mg by mouth Every 6 (Six) Hours As Needed for Mild Pain .     • nitrofurantoin, macrocrystal-monohydrate, (Macrobid) 100 MG capsule Take 1 capsule by mouth Daily. 56 capsule 3   • tamsulosin (FLOMAX) 0.4 MG capsule 24 hr capsule Take 1 capsule by mouth Every Night. 30 capsule 11     No current facility-administered medications for this visit.        Allergies:      No Known Allergies     Past Surgical History:     Past Surgical History:   Procedure Laterality Date   • BREAST BIOPSY     •  " SECTION     • CHOLECYSTECTOMY     • SKIN CANCER EXCISION      Feet         Social History:     Social History     Socioeconomic History   • Marital status:      Spouse name: Not on file   • Number of children: Not on file   • Years of education: Not on file   • Highest education level: Not on file   Tobacco Use   • Smoking status: Current Every Day Smoker   • Smokeless tobacco: Never Used   Substance and Sexual Activity   • Alcohol use: No   • Drug use: No       Family History:     Family History   Problem Relation Age of Onset   • Lung cancer Father    • Stomach cancer Mother    • Cancer Brother         bladder cancer   • Breast cancer Sister         Late 40's   • Ovarian cancer Sister 35   • Endometrial cancer Neg Hx        Review of Systems:     Review of Systems   Constitutional: Negative.  Negative for activity change, appetite change, chills, diaphoresis, fatigue and unexpected weight change.   HENT: Negative for congestion, dental problem, drooling, ear discharge, ear pain, facial swelling, hearing loss, mouth sores, nosebleeds, postnasal drip, rhinorrhea, sinus pressure, sneezing, sore throat, tinnitus, trouble swallowing and voice change.    Eyes: Negative.  Negative for photophobia, pain, discharge, redness, itching and visual disturbance.   Respiratory: Negative.  Negative for apnea, cough, choking, chest tightness, shortness of breath, wheezing and stridor.    Cardiovascular: Negative.  Negative for chest pain, palpitations and leg swelling.   Gastrointestinal: Negative.  Negative for abdominal distention, abdominal pain, anal bleeding, blood in stool, constipation, diarrhea, nausea, rectal pain and vomiting.   Endocrine: Negative.  Negative for cold intolerance, heat intolerance, polydipsia, polyphagia and polyuria.   Genitourinary: Positive for flank pain.   Musculoskeletal: Negative for arthralgias, back pain, gait problem, joint swelling, myalgias, neck pain and neck stiffness.   Skin:  Negative.  Negative for color change, pallor, rash and wound.   Allergic/Immunologic: Negative.  Negative for environmental allergies, food allergies and immunocompromised state.   Neurological: Negative.  Negative for dizziness, tremors, seizures, syncope, facial asymmetry, speech difficulty, weakness, light-headedness, numbness and headaches.   Hematological: Negative.  Negative for adenopathy. Does not bruise/bleed easily.   Psychiatric/Behavioral: Negative for agitation, behavioral problems, confusion, decreased concentration, dysphoric mood, hallucinations, self-injury, sleep disturbance and suicidal ideas. The patient is not nervous/anxious and is not hyperactive.    All other systems reviewed and are negative.      Physical Exam:     Physical Exam  Constitutional:       Appearance: She is well-developed.   HENT:      Head: Normocephalic and atraumatic.      Right Ear: External ear normal.      Left Ear: External ear normal.   Eyes:      Conjunctiva/sclera: Conjunctivae normal.      Pupils: Pupils are equal, round, and reactive to light.   Cardiovascular:      Rate and Rhythm: Normal rate and regular rhythm.      Heart sounds: Normal heart sounds.   Pulmonary:      Effort: Pulmonary effort is normal.      Breath sounds: Normal breath sounds.   Abdominal:      General: Bowel sounds are normal. There is no distension.      Palpations: Abdomen is soft. There is no mass.      Tenderness: There is no abdominal tenderness. There is no guarding or rebound.   Genitourinary:     Vagina: No vaginal discharge.   Musculoskeletal:         General: Normal range of motion.   Skin:     General: Skin is warm and dry.   Neurological:      Mental Status: She is alert.      Deep Tendon Reflexes: Reflexes are normal and symmetric.   Psychiatric:         Behavior: Behavior normal.         Thought Content: Thought content normal.         Judgment: Judgment normal.         I have reviewed the following portions of the patient's  history: allergies, current medications, past family history, past medical history, past social history, past surgical history, problem list and ROS and confirm it's accurate.      Procedure:       Assessment/Plan:   Renal calculus-we discussed the presence of the stone we discussed the various therapeutic options available including percutaneous nephrostolithotomy, lithotripsy.  We discussed the risks of lithotripsy including the passage of stones the development of a large string of stones in the distal ureter known as Steinstrasse.  In the 3% incidence of that we will need to proceed with a ureteroscopy for obstructing fragments.  Extremely rare incidence of renal hematoma.  And the significance of this.  We discussed the absolute relative indicators for intervention including the presence of sepsis, and pain we cannot control is the primary need for urgent intervention.  We discussed placement of a stent if indicated and the management of the stent as well.  Apparently had a stone seen on CT at [facility] I do not see it on the KUB that does not mean there was not a stone there however it may be small and just in visible on plain film  Recurrent urinary tract infections-patient has been referred and diagnosed with recurrent urinary tract infections.  We discussed the types of organisms that are found in the urinary tract indicating that the vast majority are results of the patient's own gastrointestinal kristopher.  We discussed how many of the antibiotics that are utilized can actually exacerbate these infections by creating resistant organisms and there is only a very few antibiotics that are concentrated in the urine and do not affect the rectal reservoir nor cause recurrent yeast vaginitis.  We discussed the risk factors for recurrent infections being intercourse in younger patients and atrophic changes in older patients.  We discussed the symptoms that are found including pain, pressure, burning, frequency, urgency  suprapubic pain and painful intercourse.  I discussed upper tract symptoms including fevers, chills, and indicated the workup would be much more aggressive if the patient were to present with recurrent infections in the face of upper tract symptomatology such as fever.  I discussed the history of vesicoureteral reflux in young patients and finally chronic renal scarring as a result of such.  I recommend concomitant probiotics with treatment with antibiotics to protect the rectal reservoir including over-the-counter yogurt preparations to paul oral pills containing the appropriate probiotics.  Placed her on both prophylactic Macrobid            Patient's Body mass index is 25.83 kg/m². BMI is above normal parameters. Recommendations include: educational material.              This document has been electronically signed by ALISSON HURTADO MD March 9, 2021 06:12 EST

## 2021-04-20 ENCOUNTER — OFFICE VISIT (OUTPATIENT)
Dept: UROLOGY | Facility: CLINIC | Age: 51
End: 2021-04-20

## 2021-04-20 VITALS — BODY MASS INDEX: 25.57 KG/M2 | HEIGHT: 70 IN | WEIGHT: 178.6 LBS | TEMPERATURE: 98.1 F

## 2021-04-20 DIAGNOSIS — N20.0 KIDNEY STONES: Primary | ICD-10-CM

## 2021-04-20 DIAGNOSIS — N39.0 RECURRENT URINARY TRACT INFECTION: ICD-10-CM

## 2021-04-20 LAB
BILIRUB BLD-MCNC: ABNORMAL MG/DL
CLARITY, POC: CLEAR
COLOR UR: YELLOW
GLUCOSE UR STRIP-MCNC: NEGATIVE MG/DL
KETONES UR QL: NEGATIVE
LEUKOCYTE EST, POC: NEGATIVE
NITRITE UR-MCNC: NEGATIVE MG/ML
PH UR: 5 [PH] (ref 5–8)
PROT UR STRIP-MCNC: NEGATIVE MG/DL
RBC # UR STRIP: ABNORMAL /UL
SP GR UR: 1.03 (ref 1–1.03)
UROBILINOGEN UR QL: NORMAL

## 2021-04-20 PROCEDURE — 87086 URINE CULTURE/COLONY COUNT: CPT | Performed by: UROLOGY

## 2021-04-20 PROCEDURE — 99213 OFFICE O/P EST LOW 20 MIN: CPT | Performed by: UROLOGY

## 2021-04-20 PROCEDURE — 81003 URINALYSIS AUTO W/O SCOPE: CPT | Performed by: UROLOGY

## 2021-04-20 NOTE — PROGRESS NOTES
Chief Complaint:          Chief Complaint   Patient presents with   • Nephrolithiasis     6 week fu        HPI:   50 y.o. female previously a patient of Dr. Rene Arciniega is here for follow-up.  Urine was trace positive for leukocytes and I will culture.  CT was okay.  I gave her reassurance I will see her back on an as-needed basis.     IMPRESSION:  Nonspecific supine view abdomen. A kidney stone is not  identified.    Past Medical History:        Past Medical History:   Diagnosis Date   • Acid reflux    • Hypertension          Current Meds:     Current Outpatient Medications   Medication Sig Dispense Refill   • acetaminophen (TYLENOL) 325 MG tablet Take 650 mg by mouth Every 6 (Six) Hours As Needed for Mild Pain .     • busPIRone (BUSPAR) 10 MG tablet Take 10 mg by mouth 3 (Three) Times a Day.     • esomeprazole (nexIUM) 40 MG capsule      • HYDROcodone-acetaminophen (NORCO) 5-325 MG per tablet 1 to 2 Tablets Every 6 Hours as Needed for PAIN 28 tablet 0   • ibuprofen (ADVIL,MOTRIN) 200 MG tablet Take 200 mg by mouth Every 6 (Six) Hours As Needed for Mild Pain .     • lisinopril (PRINIVIL,ZESTRIL) 10 MG tablet Take 10 mg by mouth Daily.     • nitrofurantoin, macrocrystal-monohydrate, (Macrobid) 100 MG capsule Take 1 capsule by mouth Daily. 56 capsule 3   • tamsulosin (FLOMAX) 0.4 MG capsule 24 hr capsule Take 1 capsule by mouth Every Night. 30 capsule 11     No current facility-administered medications for this visit.        Allergies:      No Known Allergies     Past Surgical History:     Past Surgical History:   Procedure Laterality Date   • BREAST BIOPSY     •  SECTION     • CHOLECYSTECTOMY     • SKIN CANCER EXCISION      Feet         Social History:     Social History     Socioeconomic History   • Marital status:      Spouse name: Not on file   • Number of children: Not on file   • Years of education: Not on file   • Highest education level: Not on file   Tobacco Use   • Smoking status: Current  Every Day Smoker   • Smokeless tobacco: Never Used   Substance and Sexual Activity   • Alcohol use: No   • Drug use: No       Family History:     Family History   Problem Relation Age of Onset   • Lung cancer Father    • Stomach cancer Mother    • Cancer Brother         bladder cancer   • Breast cancer Sister         Late 40's   • Ovarian cancer Sister 35   • Endometrial cancer Neg Hx        Review of Systems:     Review of Systems   Constitutional: Negative.  Negative for activity change, appetite change, chills, diaphoresis, fatigue and unexpected weight change.   HENT: Negative for congestion, dental problem, drooling, ear discharge, ear pain, facial swelling, hearing loss, mouth sores, nosebleeds, postnasal drip, rhinorrhea, sinus pressure, sneezing, sore throat, tinnitus, trouble swallowing and voice change.    Eyes: Negative.  Negative for photophobia, pain, discharge, redness, itching and visual disturbance.   Respiratory: Negative.  Negative for apnea, cough, choking, chest tightness, shortness of breath, wheezing and stridor.    Cardiovascular: Negative.  Negative for chest pain, palpitations and leg swelling.   Gastrointestinal: Negative.  Negative for abdominal distention, abdominal pain, anal bleeding, blood in stool, constipation, diarrhea, nausea, rectal pain and vomiting.   Endocrine: Negative.  Negative for cold intolerance, heat intolerance, polydipsia, polyphagia and polyuria.   Musculoskeletal: Negative.  Negative for arthralgias, back pain, gait problem, joint swelling, myalgias, neck pain and neck stiffness.   Skin: Negative.  Negative for color change, pallor, rash and wound.   Allergic/Immunologic: Negative.  Negative for environmental allergies, food allergies and immunocompromised state.   Neurological: Negative.  Negative for dizziness, tremors, seizures, syncope, facial asymmetry, speech difficulty, weakness, light-headedness, numbness and headaches.   Hematological: Negative.  Negative  for adenopathy. Does not bruise/bleed easily.   Psychiatric/Behavioral: Negative for agitation, behavioral problems, confusion, decreased concentration, dysphoric mood, hallucinations, self-injury, sleep disturbance and suicidal ideas. The patient is not nervous/anxious and is not hyperactive.    All other systems reviewed and are negative.      Physical Exam:     Physical Exam  Constitutional:       Appearance: She is well-developed.   HENT:      Head: Normocephalic and atraumatic.      Right Ear: External ear normal.      Left Ear: External ear normal.   Eyes:      Conjunctiva/sclera: Conjunctivae normal.      Pupils: Pupils are equal, round, and reactive to light.   Cardiovascular:      Rate and Rhythm: Normal rate and regular rhythm.      Heart sounds: Normal heart sounds.   Pulmonary:      Effort: Pulmonary effort is normal.      Breath sounds: Normal breath sounds.   Abdominal:      General: Bowel sounds are normal. There is no distension.      Palpations: Abdomen is soft. There is no mass.      Tenderness: There is no abdominal tenderness. There is no guarding or rebound.   Genitourinary:     Vagina: No vaginal discharge.   Musculoskeletal:         General: Normal range of motion.   Skin:     General: Skin is warm and dry.   Neurological:      Mental Status: She is alert.      Deep Tendon Reflexes: Reflexes are normal and symmetric.   Psychiatric:         Behavior: Behavior normal.         Thought Content: Thought content normal.         Judgment: Judgment normal.         I have reviewed the following portions of the patient's history: allergies, current medications, past family history, past medical history, past social history, past surgical history, problem list and ROS and confirm it's accurate.      Procedure:       Assessment/Plan:   Renal calculus-we discussed the presence of the stone we discussed the various therapeutic options available including percutaneous nephrostolithotomy, lithotripsy.  We  discussed the risks of lithotripsy including the passage of stones the development of a large string of stones in the distal ureter known as Steinstrasse.  In the 3% incidence of that we will need to proceed with a ureteroscopy for obstructing fragments.  Extremely rare incidence of renal hematoma.  And the significance of this.  We discussed the absolute relative indicators for intervention including the presence of sepsis, and pain we cannot control is the primary need for urgent intervention.  We discussed placement of a stent if indicated and the management of the stent as well.  His KUB is negative.  We will see her back on an as needed basis            Patient's Body mass index is 25.63 kg/m². BMI is above normal parameters. Recommendations include: educational material.              This document has been electronically signed by ALISSON HURTADO MD April 20, 2021 08:25 EDT

## 2021-04-21 LAB — BACTERIA SPEC AEROBE CULT: NORMAL

## 2022-04-25 ENCOUNTER — OFFICE VISIT (OUTPATIENT)
Dept: CARDIOLOGY | Facility: CLINIC | Age: 52
End: 2022-04-25

## 2022-04-25 VITALS
SYSTOLIC BLOOD PRESSURE: 141 MMHG | BODY MASS INDEX: 26.32 KG/M2 | TEMPERATURE: 97.1 F | DIASTOLIC BLOOD PRESSURE: 86 MMHG | HEART RATE: 98 BPM | OXYGEN SATURATION: 99 % | WEIGHT: 183.4 LBS

## 2022-04-25 DIAGNOSIS — R07.2 PRECORDIAL PAIN: Primary | ICD-10-CM

## 2022-04-25 DIAGNOSIS — Z72.0 TOBACCO ABUSE: ICD-10-CM

## 2022-04-25 PROCEDURE — 93000 ELECTROCARDIOGRAM COMPLETE: CPT | Performed by: INTERNAL MEDICINE

## 2022-04-25 PROCEDURE — 99204 OFFICE O/P NEW MOD 45 MIN: CPT | Performed by: INTERNAL MEDICINE

## 2022-04-25 RX ORDER — DEXLANSOPRAZOLE 60 MG/1
60 CAPSULE, DELAYED RELEASE ORAL DAILY
COMMUNITY

## 2022-04-25 RX ORDER — VARENICLINE TARTRATE 1 MG/1
1 TABLET, FILM COATED ORAL 2 TIMES DAILY
Qty: 56 TABLET | Refills: 4 | Status: SHIPPED | OUTPATIENT
Start: 2022-05-23 | End: 2022-10-10

## 2022-04-25 RX ORDER — HYDROCHLOROTHIAZIDE 25 MG/1
25 TABLET ORAL DAILY
COMMUNITY

## 2022-04-25 RX ORDER — ASPIRIN 81 MG/1
81 TABLET, CHEWABLE ORAL DAILY
COMMUNITY

## 2022-04-25 RX ORDER — METOPROLOL TARTRATE 50 MG/1
50 TABLET, FILM COATED ORAL DAILY
COMMUNITY

## 2022-04-25 NOTE — PROGRESS NOTES
Edilma Matos APRN  Aliza Canela  1970 04/25/2022    Patient Active Problem List   Diagnosis   • Recurrent urinary tract infection   • Kidney stones       Dear Edilma Matos APRN:    Subjective     Aliza Canela is a 51 y.o. female with the problems as listed above, presents    Chief complaint: Recurrent chest pains.    History of Present Illness: Ms. Canela is a pleasant 51-year-old  female with no history of known coronary artery disease or structural heart disease, nondiabetic, , presents with complaints of having recurrent chest pains for about a month or so.  She describes these pains as being felt as dull pains across her chest that seem to occur at random with no relation to exertion and relieved spontaneously.  These only last for a few minutes and are associated with some shortness of breath at times.  They are of moderate intensity.  She has a positive family history of premature coronary artery disease with her mother having had heart attack in her 50s.  She has history of smoking of about a pack a day for 25 years.    Cardiac risk factors:hypertension, smoking and Positive family Hx. of premature athersclerotivc disease.    No Known Allergies:      Current Outpatient Medications:   •  aspirin 81 MG chewable tablet, Chew 81 mg Daily., Disp: , Rfl:   •  dexlansoprazole (DEXILANT) 60 MG capsule, Take 60 mg by mouth Daily., Disp: , Rfl:   •  hydroCHLOROthiazide (HYDRODIURIL) 25 MG tablet, Take 25 mg by mouth Daily., Disp: , Rfl:   •  metoprolol tartrate (LOPRESSOR) 50 MG tablet, Take 50 mg by mouth Daily., Disp: , Rfl:   •  acetaminophen (TYLENOL) 325 MG tablet, Take 650 mg by mouth Every 6 (Six) Hours As Needed for Mild Pain ., Disp: , Rfl:   •  busPIRone (BUSPAR) 10 MG tablet, Take 10 mg by mouth 3 (Three) Times a Day., Disp: , Rfl:   •  esomeprazole (nexIUM) 40 MG capsule, , Disp: , Rfl:   •  HYDROcodone-acetaminophen (NORCO) 5-325 MG per tablet, 1 to 2 Tablets Every 6 Hours as  Needed for PAIN, Disp: 28 tablet, Rfl: 0  •  ibuprofen (ADVIL,MOTRIN) 200 MG tablet, Take 200 mg by mouth Every 6 (Six) Hours As Needed for Mild Pain ., Disp: , Rfl:   •  nitrofurantoin, macrocrystal-monohydrate, (Macrobid) 100 MG capsule, Take 1 capsule by mouth Daily., Disp: 56 capsule, Rfl: 3  •  tamsulosin (FLOMAX) 0.4 MG capsule 24 hr capsule, Take 1 capsule by mouth Every Night., Disp: 30 capsule, Rfl: 11  •  varenicline (CHANTIX CARRI) 0.5 MG X 11 & 1 MG X 42 tablet, Take 0.5 mg po daily x 3 days, then 0.5 mg po bid x 4 days, then 1 mg po bid, Disp: 53 tablet, Rfl: 0  •  [START ON 2022] varenicline (CHANTIX) 1 MG tablet, Take 1 tablet by mouth 2 (Two) Times a Day for 140 days., Disp: 56 tablet, Rfl: 4    Past Medical History:   Diagnosis Date   • Acid reflux    • Hypertension      Past Surgical History:   Procedure Laterality Date   • BREAST BIOPSY     •  SECTION     • CHOLECYSTECTOMY     • SKIN CANCER EXCISION      Feet     Family History   Problem Relation Age of Onset   • Heart attack Mother    • Stomach cancer Mother    • Lung cancer Father    • Breast cancer Sister         Late 40's   • Ovarian cancer Sister 35   • Cancer Brother         bladder cancer   • Heart attack Maternal Grandmother    • Endometrial cancer Neg Hx      Social History     Tobacco Use   • Smoking status: Current Every Day Smoker     Packs/day: 1.00   • Smokeless tobacco: Never Used   Substance Use Topics   • Alcohol use: No   • Drug use: No       Review of Systems   Constitutional: Positive for malaise/fatigue and weight gain.   HENT: Positive for ear discharge, ear pain and sore throat.    Eyes: Positive for blurred vision.        WEARS GLASSES   Cardiovascular: Positive for chest pain, dyspnea on exertion and palpitations.   Respiratory: Positive for shortness of breath.    Skin: Positive for color change and unusual hair distribution.        BREAST LUMPS, BREAST DISCHARGE,VARICOSE VEINS   Musculoskeletal: Positive for  back pain, joint pain and joint swelling.   Gastrointestinal: Positive for abdominal pain and heartburn.   Genitourinary: Positive for frequency.        BLOOD IN URINE, KIDNEY STONES       Objective   Blood pressure 141/86, pulse 98, temperature 97.1 °F (36.2 °C), weight 83.2 kg (183 lb 6.4 oz), SpO2 99 %.  Body mass index is 26.32 kg/m².    Constitutional:       Appearance: Well-developed.   Eyes:      Conjunctiva/sclera: Conjunctivae normal.   HENT:      Head: Normocephalic.   Neck:      Thyroid: No thyromegaly.      Vascular: No JVD.      Trachea: No tracheal deviation.   Pulmonary:      Effort: No respiratory distress.      Breath sounds: Normal breath sounds. No wheezing. No rales.   Cardiovascular:      PMI at left midclavicular line. Normal rate. Regular rhythm. Normal S1. Normal S2.      Murmurs: There is no murmur.      No gallop. No click. No rub.   Pulses:     Intact distal pulses.   Edema:     Peripheral edema absent.   Abdominal:      General: Bowel sounds are normal.      Palpations: Abdomen is soft. There is no abdominal mass.      Tenderness: There is no abdominal tenderness.   Musculoskeletal:      Cervical back: Normal range of motion and neck supple. Skin:     General: Skin is warm and dry.   Neurological:      Mental Status: Alert and oriented to person, place, and time.      Cranial Nerves: No cranial nerve deficit.         Lab Results   Component Value Date     08/13/2020    K 4.0 08/13/2020     08/13/2020    CO2 26.9 08/13/2020    BUN 14 08/13/2020    CREATININE 0.66 08/13/2020    GLUCOSE 99 08/13/2020    CALCIUM 9.9 08/13/2020    AST 16 08/13/2020    ALT 24 08/13/2020    ALKPHOS 74 08/13/2020     No results found for: CKTOTAL  Lab Results   Component Value Date    WBC 8.96 08/13/2020    HGB 15.2 08/13/2020    HCT 44.5 08/13/2020     08/13/2020     No results found for: INR  No results found for: MG  Lab Results   Component Value Date    TSH 1.160 08/13/2020    TRIG 191 (H)  08/13/2020    HDL 33 (L) 08/13/2020     (H) 08/13/2020          ECG 12 Lead    Date/Time: 4/25/2022 9:16 AM  Performed by: Jesus Gilmore MD  Authorized by: Jesus Gilmore MD   Previous ECG: no previous ECG available  Rhythm: sinus rhythm  Conduction: conduction normal  ST Segments: ST segments normal  T Waves: T waves normal    Clinical impression: normal ECG                Assessment/Plan :   Diagnosis Plan   1. Precordial pain  Stress Test With Myocardial Perfusion (1 Day)   2. Tobacco abuse         Recommendations:  Orders Placed This Encounter   Procedures   • Stress Test With Myocardial Perfusion (1 Day)   • ECG 12 Lead        1. Will increase the metoprolol to 75 mg twice a day (take 1-1/2 tablet of 50 mg twice a day).  2. Continue with aspirin and HCTZ.  3. Will evaluate further with a stress sestamibi study.  4. I have also strongly emphasized for her to quit smoking.  She is willing to try Chantix.  I did explain to her about the side effects of mood changes and suicidal thoughts at times.  Patient expressed understanding and is willing to try.    Return in about 4 weeks (around 5/23/2022).    As always, I appreciate very much the opportunity to participate in the cardiovascular care of your patients.      With Best Regards,    Jesus Gilmore MD, [facility]    Dragon disclaimer:  Much of this encounter note is an electronic transcription/translation of spoken language to printed text. The electronic translation of spoken language may permit erroneous, or at times, nonsensical words or phrases to be inadvertently transcribed; Although I have reviewed the note for such errors, some may still exist.

## 2022-06-01 ENCOUNTER — APPOINTMENT (OUTPATIENT)
Dept: NUCLEAR MEDICINE | Facility: HOSPITAL | Age: 52
End: 2022-06-01

## 2022-06-01 ENCOUNTER — APPOINTMENT (OUTPATIENT)
Dept: CARDIOLOGY | Facility: HOSPITAL | Age: 52
End: 2022-06-01

## 2023-01-17 ENCOUNTER — TRANSCRIBE ORDERS (OUTPATIENT)
Dept: ADMINISTRATIVE | Facility: HOSPITAL | Age: 53
End: 2023-01-17
Payer: COMMERCIAL

## 2023-01-17 ENCOUNTER — LAB (OUTPATIENT)
Dept: LAB | Facility: HOSPITAL | Age: 53
End: 2023-01-17
Payer: COMMERCIAL

## 2023-01-17 DIAGNOSIS — J03.90 TONSILLITIS: ICD-10-CM

## 2023-01-17 DIAGNOSIS — J03.90 TONSILLITIS: Primary | ICD-10-CM

## 2023-01-17 PROCEDURE — 87070 CULTURE OTHR SPECIMN AEROBIC: CPT

## 2023-01-17 PROCEDURE — 87205 SMEAR GRAM STAIN: CPT

## 2023-01-19 LAB
BACTERIA SPEC AEROBE CULT: NORMAL
GRAM STN SPEC: NORMAL
GRAM STN SPEC: NORMAL

## 2023-08-08 ENCOUNTER — TRANSCRIBE ORDERS (OUTPATIENT)
Dept: ADMINISTRATIVE | Facility: HOSPITAL | Age: 53
End: 2023-08-08
Payer: COMMERCIAL

## 2023-08-08 DIAGNOSIS — Z12.31 VISIT FOR SCREENING MAMMOGRAM: Primary | ICD-10-CM

## 2024-08-13 ENCOUNTER — TRANSCRIBE ORDERS (OUTPATIENT)
Dept: ADMINISTRATIVE | Facility: HOSPITAL | Age: 54
End: 2024-08-13
Payer: COMMERCIAL

## 2024-08-13 DIAGNOSIS — Z12.31 VISIT FOR SCREENING MAMMOGRAM: ICD-10-CM

## 2024-08-13 DIAGNOSIS — R10.2 PELVIC PAIN SYNDROME: Primary | ICD-10-CM

## 2024-08-30 ENCOUNTER — HOSPITAL ENCOUNTER (OUTPATIENT)
Dept: ULTRASOUND IMAGING | Facility: HOSPITAL | Age: 54
Discharge: HOME | End: 2024-08-30
Admitting: INTERNAL MEDICINE
Payer: COMMERCIAL

## 2024-08-30 ENCOUNTER — APPOINTMENT (OUTPATIENT)
Dept: MAMMOGRAPHY | Facility: HOSPITAL | Age: 54
End: 2024-08-30
Payer: COMMERCIAL

## 2024-08-30 DIAGNOSIS — R10.2 PELVIC PAIN SYNDROME: ICD-10-CM

## 2024-08-30 PROCEDURE — 76856 US EXAM PELVIC COMPLETE: CPT
